# Patient Record
Sex: FEMALE | Race: BLACK OR AFRICAN AMERICAN
[De-identification: names, ages, dates, MRNs, and addresses within clinical notes are randomized per-mention and may not be internally consistent; named-entity substitution may affect disease eponyms.]

---

## 2017-05-15 ENCOUNTER — HOSPITAL ENCOUNTER (OUTPATIENT)
Dept: HOSPITAL 62 - END | Age: 67
Discharge: HOME | End: 2017-05-15
Attending: INTERNAL MEDICINE
Payer: MEDICARE

## 2017-05-15 VITALS — SYSTOLIC BLOOD PRESSURE: 127 MMHG | DIASTOLIC BLOOD PRESSURE: 67 MMHG

## 2017-05-15 DIAGNOSIS — Z79.82: ICD-10-CM

## 2017-05-15 DIAGNOSIS — D12.5: Primary | ICD-10-CM

## 2017-05-15 DIAGNOSIS — E78.2: ICD-10-CM

## 2017-05-15 DIAGNOSIS — I10: ICD-10-CM

## 2017-05-15 DIAGNOSIS — Z79.899: ICD-10-CM

## 2017-05-15 DIAGNOSIS — Z79.84: ICD-10-CM

## 2017-05-15 DIAGNOSIS — I25.9: ICD-10-CM

## 2017-05-15 DIAGNOSIS — G40.909: ICD-10-CM

## 2017-05-15 DIAGNOSIS — E11.9: ICD-10-CM

## 2017-05-15 DIAGNOSIS — I25.10: ICD-10-CM

## 2017-05-15 DIAGNOSIS — K64.8: ICD-10-CM

## 2017-05-15 DIAGNOSIS — D64.9: ICD-10-CM

## 2017-05-15 PROCEDURE — 88305 TISSUE EXAM BY PATHOLOGIST: CPT

## 2017-05-15 PROCEDURE — 45380 COLONOSCOPY AND BIOPSY: CPT

## 2017-05-15 PROCEDURE — 82962 GLUCOSE BLOOD TEST: CPT

## 2017-05-15 PROCEDURE — 0DBN8ZX EXCISION OF SIGMOID COLON, VIA NATURAL OR ARTIFICIAL OPENING ENDOSCOPIC, DIAGNOSTIC: ICD-10-PCS | Performed by: INTERNAL MEDICINE

## 2017-05-15 NOTE — OPERATIVE REPORT
Operative Report


DATE OF SURGERY: 05/15/17


Operative Report: 


The risks, benefits and alternatives of the procedure including risks of 

bleeding, perforation requiring surgery are explained to the patient detail and 

informed consent is obtained.  Patient is brought back to the endoscopy suite 

and placed in a left, lateral decubital position.  Timeout is called.  Propofol 

medications administered.  A rectal examination was done which did not reveal 

any masses, tears or fissures.  An Olympus was scope is inserted into the 

patient's rectum.  The scope was then gradually advanced all the way to the 

cecum.  The cecum was identified by the usual anatomical landmarks including 

the ileocecal valve as well as the appendiceal office.  Photodocumentation was 

obtained.  The scope is then sequentially pulled back via the rest segments of 

the colon including the ascending colon, hepatic flexure, transverse colon, 

splenic flexure, descending colon and finally into the rectosigmoid portions of 

the colon.  Retroflexion maneuvers performed.  Prep is good.


PREOPERATIVE DIAGNOSIS: Personal history of polyps.


POSTOPERATIVE DIAGNOSIS: Small sessile polyp in the sigmoid colon status post 

biopsy for removal.  Internal hemorrhoids


OPERATION: Colonoscopy with biopsy


SURGEON: PARAMJIT LLOYD


ANESTHESIA: LMAC


TISSUE REMOVED OR ALTERED: Sessile polyp removed as described above


COMPLICATIONS: 


None.


ESTIMATED BLOOD LOSS: none


INTRAOPERATIVE FINDINGS: No AVMs, diverticulosis noted.  Internal hemorrhoids 

are visualized.


PROCEDURE: 


Patient tolerated procedure well.


No immediate postprocedure complications are noted.


Patient discharged in good condition.


Discharge date 05/15/2017.


Discharge diet: Regular.


Discharge activity: Regular.


2-3 week follow-up to discuss findings.


Patient is instructed to call the office or proceed to the emergency room 

should there be any further problems or questions.


We'll await on biopsies.

## 2017-10-12 ENCOUNTER — HOSPITAL ENCOUNTER (EMERGENCY)
Dept: HOSPITAL 62 - ER | Age: 67
Discharge: HOME | End: 2017-10-12
Payer: MEDICARE

## 2017-10-12 VITALS — SYSTOLIC BLOOD PRESSURE: 142 MMHG | DIASTOLIC BLOOD PRESSURE: 81 MMHG

## 2017-10-12 DIAGNOSIS — I25.2: ICD-10-CM

## 2017-10-12 DIAGNOSIS — I10: ICD-10-CM

## 2017-10-12 DIAGNOSIS — E11.9: ICD-10-CM

## 2017-10-12 DIAGNOSIS — M25.462: Primary | ICD-10-CM

## 2017-10-12 DIAGNOSIS — I25.10: ICD-10-CM

## 2017-10-12 DIAGNOSIS — Z95.5: ICD-10-CM

## 2017-10-12 PROCEDURE — 99283 EMERGENCY DEPT VISIT LOW MDM: CPT

## 2017-10-12 NOTE — ER DOCUMENT REPORT
ED Extremity Problem, Lower





- General


Chief Complaint: Leg Swelling


Stated Complaint: LEFT KNEE SWELLING


Time Seen by Provider: 10/12/17 17:11


Mode of Arrival: Ambulatory


Information source: Patient


Notes: 





67-year-old female presents to ED for complaint of abnormal swelling to the 

left knee.  She has no history of trauma or pain.  She states it swelled up 

like this about a year ago with back canal without call back if she is 

concerned.


TRAVEL OUTSIDE OF THE [place] IN LAST 30 DAYS: No





- HPI


Patient complains to provider of: Swelling.  No: Injury, Pain


Location: Knee


Occurred: Last week


Where: Home


Onset/Duration: Gradual


Quality of pain: No pain


Severity: None


Pain Level: Denies


Context: Other


Recent injury: No


Exacerbated by: Nothing


Relieved by: Nothing





- Related Data


Allergies/Adverse Reactions: 


 





No Known Allergies Allergy (Verified 10/12/17 16:58)


 











Past Medical History





- General


Information source: Patient





- Social History


Smoking Status: Never Smoker


Cigarette use (# per day): No


Chew tobacco use (# tins/day): No


Smoking Education Provided: No


Frequency of alcohol use: None


Drug Abuse: None


Occupation: none


Family History: Reviewed & Not Pertinent


Patient has suicidal ideation: No





- Past Medical History


Cardiac Medical History: Reports: Hx Coronary Artery Disease, Hx Heart Attack - 

MILD 2013 STENT 2013, Hx Hypercholesterolemia, Hx Hypertension


Pulmonary Medical History: Reports: None


EENT Medical History: Reports: None


Neurological Medical History: Reports: Hx Cerebrovascular Accident - 2000 

WEAKER ON LEFT, Hx Seizures - 2000 LAST SEIZURE, Other - brownlee brownlee


Endocrine Medical History: Reports: Hx Diabetes Mellitus Type 2


Renal/ Medical History: Reports: None


Malignancy Medical History: Reports: None


GI Medical History: Reports: Hx Colonoscopy


Musculoskeltal Medical History: Reports Hx Musculoskeletal Trauma


Skin Medical History: Reports None


Psychiatric Medical History: Reports: None


Traumatic Medical History: Reports: None


Infectious Medical History: Reports: None


Past Surgical History: Reports: Hx Cardiac Catheterization, Hx Coronary Stent, 

Hx Neurologic Surgery - Brain surgery 2 for more more, Hx Tonsillectomy





- Immunizations


Hx Diphtheria, Pertussis, Tetanus Vaccination: Yes





Review of Systems





- Review of Systems


Constitutional: No symptoms reported


EENT: No symptoms reported


Cardiovascular: No symptoms reported


Respiratory: No symptoms reported


Gastrointestinal: No symptoms reported


Genitourinary: No symptoms reported


Female Genitourinary: No symptoms reported


Musculoskeletal: Joint swelling - Swelling to the left knee


Skin: No symptoms reported


Hematologic/Lymphatic: No symptoms reported


Neurological/Psychological: No symptoms reported


-: Yes All other systems reviewed and negative





Physical Exam





- Vital signs


Vitals: 


 











Temp Pulse BP Pulse Ox


 


 99.4 F   99   149/76 H  96 


 


 10/12/17 17:01  10/12/17 17:01  10/12/17 17:01  10/12/17 17:01











Interpretation: Normal





- General


General appearance: Appears well, Alert





- HEENT


Head: Normocephalic, Atraumatic


Eyes: Normal


Pupils: PERRL





- Respiratory


Respiratory status: No respiratory distress


Chest status: Nontender


Breath sounds: Normal


Chest palpation: Normal





- Cardiovascular


Rhythm: Regular


Heart sounds: Normal auscultation


Murmur: No





- Abdominal


Inspection: Normal


Distension: No distension


Bowel sounds: Normal


Tenderness: Nontender


Organomegaly: No organomegaly





- Back


Back: Normal, Nontender





- Extremities


General upper extremity: Normal inspection, Nontender, Normal color, Normal ROM

, Normal temperature


General lower extremity: Nontender, Normal color, Normal ROM, Normal temperature

, Normal weight bearing.  No: Pedro's sign


Knee: Deformity - Swelling which looks like a bone malformation to the left 

knee.  No: Tender, Pain with ROM





- Neurological


Neuro grossly intact: Yes


Cognition: Normal


Orientation: AAOx4


[place] Coma Scale Eye Opening: Spontaneous


[place] Coma Scale Verbal: Oriented


[place] Coma Scale Motor: Obeys Commands


[place] Coma Scale Total: 15


Speech: Normal


Motor strength normal: LUE, RUE, LLE, RLE


Sensory: Normal





- Psychological


Associated symptoms: Normal affect, Normal mood





- Skin


Skin Temperature: Warm


Skin Moisture: Dry


Skin Color: Normal





Course





- Re-evaluation


Re-evalutation: 





10/12/17 18:43


Discussed x-ray with patient.  Patient has had no pain in this area has no pain 

in this area at this time.  Will recommend patient follow-up with orthopedics 

with continued concern about this area of her knee.  X-ray was negative.





- Vital Signs


Vital signs: 


 











Temp Pulse Resp BP Pulse Ox


 


 99.4 F   99      142/81 H  96 


 


 10/12/17 17:01  10/12/17 19:09     10/12/17 19:09  10/12/17 19:09














- Diagnostic Test


Radiology reviewed: Image reviewed, Reports reviewed





Discharge





- Discharge


Clinical Impression: 


 Swelling of left knee joint





Condition: Stable


Disposition: HOME, SELF-CARE


Additional Instructions: 


She was seen today for "swelling to your knee ".  There is a area of you needs 

it is larger than on the other knee.  It is firm.  According to the x-ray there 

is no breaks no abnormalities in the knee.





You have denied any pain to this area at this time.





Follow-up with orthopedics to have them reevaluate your knee in this area it is 

concerning to you.





Use Tylenol or ibuprofen for any discomfort that may develop.





Acetaminophen





     Acetaminophen may be taken for pain relief or fever control. It's much 

safer than aspirin, offering a wider range of "safe" dosages.  It is safe 

during pregnancy.  Some brand names are Tylenol, Panadol, Datril, Anacin 3, 

Tempra, and Liquiprin. Acetaminophen can be repeated every four hours.  The 

following are maximum recommended dosages:





WEIGHT         Dose             Drops                  Elixir        Chewable(

80mg)


(LBS.)                            drprs=droppers    tsp=teaspoon


6                 40 mg            .4 ml (1/2)


6-11            80 mg            .8 ml (full)            1/2   tsp           1 

      tab


12-16         120 mg           1 1/2 drprs            3/4   tsp           1 1/2

  tabs


17-23         160 mg             2  drprs              1      tsp           2  

     tabs


24-30         240 mg             3  drprs              1 1/2 tsp           3   

    tabs


30-35         320 mg                                     2       tsp           

4       tabs


36-41         360 mg                                     2 1/4 tsp           4 1

/2  tabs


42-47         400 mg                                     2 1/2 tsp           5 

      tabs


48-53         480 mg                                     3       tsp          6

       tabs


54-59         520 mg                                     3  1/4 tsp          6 1

/2 tabs


60-64         560 mg                                     3  1/2 tsp          7 

     tabs 


65-70         600 mg                                     3  3/4 tsp          7 1

/2 tabs


71-76         640 mg                                     4       tsp           

8      tabs


77-82         720 mg                                     4 1/2  tsp           9

      tabs


83-88         800 mg                                     5       tsp           

10     tabs





>89 pounds or adults          650 mg to 900 mg 





Acetaminophen can be repeated every four hours. Maximum daily dose not to 

exceed 4000 mg.





   These maximum recommended dosages are slightly higher than the dosages 

written on the product container, but these dosages are very safe and well 

below the toxic dosage for acetaminophen.





FOLLOW-UP CARE:


If you have been referred to a physician for follow-up care, call the physician

s office for an appointment as you were instructed or within the next two days.

  If you experience worsening or a significant change in your symptoms, notify 

the physician immediately or return to the Emergency Department at any time for 

re-evaluation.





Forms:  Elevated Blood Pressure


Referrals: 


FARIDA MELTON MD [Primary Care Provider] - Follow up as needed


HA ALAS MD [ACTIVE STAFF] - Follow up as needed

## 2017-10-12 NOTE — RADIOLOGY REPORT (SQ)
EXAM DESCRIPTION:  KNEE LEFT 4 VIEW



COMPLETED DATE/TIME:  10/12/2017 5:48 pm



REASON FOR STUDY:  swelling



COMPARISON:  None.



NUMBER OF VIEWS:  Four views.



TECHNIQUE:  AP, lateral, and both oblique radiographic images acquired of the left knee.



LIMITATIONS:  None.



FINDINGS:  MINERALIZATION: Osteopenia

BONES: No acute fracture or dislocation.  No worrisome bone lesions.  Benign bony spurring at the sandie
driceps and patellar tendon attachments to the patella

JOINT: No effusion.

SOFT TISSUES: No soft tissue swelling.  No radio-opaque foreign body.

OTHER: No other significant finding.



IMPRESSION:  No acute changes



TECHNICAL DOCUMENTATION:  JOB ID:  [id number]

 2011 [employer]- All Rights Reserved

## 2019-01-04 ENCOUNTER — HOSPITAL ENCOUNTER (OUTPATIENT)
Dept: HOSPITAL 62 - SP | Age: 69
End: 2019-01-04
Attending: PODIATRIST
Payer: MEDICARE

## 2019-01-04 DIAGNOSIS — E11.9: ICD-10-CM

## 2019-01-04 DIAGNOSIS — I70.25: Primary | ICD-10-CM

## 2019-01-04 PROCEDURE — 93922 UPR/L XTREMITY ART 2 LEVELS: CPT

## 2019-01-04 PROCEDURE — 93925 LOWER EXTREMITY STUDY: CPT

## 2019-01-04 NOTE — XCELERA REPORT
[address] [address]

                               Tel: [phone]

                               Fax: [phone]



                      Lower Extremity Arterial Evaluation

_______________________________________________________________________________



Name: RASHMI TAYLOR

MRN: [id number]                                       Age: 68 yrs

Gender: Female                                        : 1950

Patient Status: Outpatient                            Patient Location: [facility]

Account #: [id number]

Study Date: 2019 12:17 PM

                          Accession #: [id number]

_______________________________________________________________________________

Procedure: A color flow and duplex scan of the lower extremity arteries was

performed bilaterally with velocity and waveform anaylsis. Ankle brachial

indicies performed.

Reason For Study: PAD, DM







Ordering Physician: TELLO PARTIDA

Performed By: Sandra Pacheco

_______________________________________________________________________________

_______________________________________________________________________________





Measurements and Calculations



                                     Right  Left

                     Prox PFA PSV    117.1 114.4 cm/sec

                     Prox SFA PSV    116.4  81.9 cm/sec

                     Mid SFA PSV     70.3   87.7 cm/sec

                     Dist SFA PSV    90.9  109.4 cm/sec

                     Prox Pop A PSV  49.6        cm/sec

                     Mid ROLAND PSV     46.0        cm/sec

                     Dist ROLAND PSV           87.3 cm/sec

                     Mid PTA PSV     49.5   39.7 cm/sec



_______________________________________________________________________________

Right Side Arterial Evaluation

Normal velocity and triphasic waveforms noted in the Common Femoral artery.

Biphasic with normal velocity to the infrageniculate. vessels .

Ankle Brachial index 1.06, in the Posterior Tibial. 0.83 in the Dorsalis

Pedis..

PPG's are mildly attenuated, demonstrate dicrotic notch.



Left Side Arterial Evaluation

Normal velocity and biphasic waveforms noted in the Common Femoral artery to

the infrageniculate. vessels .

Ankle Brachial index 1.08, in the Posterior Tibial. 1.16 in the Dorsalis

Pedis..

PPG's are mildly attenuated, demonstrate dicrotic notch.



_______________________________________________________________________________

Interpretation Summary

Fairly small abnormalities in waveform, ANIA's and PPG's suggest very mild

arterial disease, bilaterally.

_______________________________________________________________________________

Electronically signed by:      Lennox Williams      on 2019 04:28 PM



CC: TELLO PARTIDA

>

Williams, Lennox

## 2019-04-15 ENCOUNTER — HOSPITAL ENCOUNTER (OUTPATIENT)
Dept: HOSPITAL 62 - WI | Age: 69
End: 2019-04-15
Attending: INTERNAL MEDICINE
Payer: MEDICARE

## 2019-04-15 DIAGNOSIS — Z12.31: Primary | ICD-10-CM

## 2019-04-15 PROCEDURE — 77063 BREAST TOMOSYNTHESIS BI: CPT

## 2019-04-15 PROCEDURE — 77067 SCR MAMMO BI INCL CAD: CPT

## 2020-10-28 ENCOUNTER — HOSPITAL ENCOUNTER (OUTPATIENT)
Dept: HOSPITAL 62 - WI | Age: 70
End: 2020-10-28
Attending: PHYSICIAN ASSISTANT
Payer: MEDICARE

## 2020-10-28 DIAGNOSIS — Z12.31: Primary | ICD-10-CM

## 2020-10-28 DIAGNOSIS — Z78.0: ICD-10-CM

## 2020-10-28 DIAGNOSIS — M85.852: ICD-10-CM

## 2020-10-28 PROCEDURE — 77063 BREAST TOMOSYNTHESIS BI: CPT

## 2020-10-28 PROCEDURE — 77080 DXA BONE DENSITY AXIAL: CPT

## 2020-10-28 PROCEDURE — 77067 SCR MAMMO BI INCL CAD: CPT

## 2020-10-28 NOTE — WOMENS IMAGING REPORT
EXAM DESCRIPTION:  BONE DENSITY HIP/SPINE



IMAGES COMPLETED DATE/TIME:  10/28/2020 8:30 am



REASON FOR STUDY:  Z78.0 ASYMPTOMATIC MENOPAUSAL STATE Z12.31  ENCNTR SCREEN MAMMOGRAM FOR MALIGNANT 
NEOPLASM OF LULI Z78.0  ASYMPTOMATIC MENOPAUSAL STATE



COMPARISON:   None.



TECHNIQUE:  Dual-Energy X-ray Absorptiometry (DEXA) of the AP Spine and Hip.



LIMITATIONS:  None.



FINDINGS:  LUMBAR SPINE:

The bone mineral density (BMD) measured from L1-L4 in the AP projection correlates with a T-score of 
-0.9, which is normal as defined by the World Health Organization.

HIP:

The bone mineral density (BMD) measured in the left hip correlates with a T-score of -1.4, which is o
steopenia as defined by the World Health Organization.

10 year fracture risk

Major osteoporotic fracture: 3.8%

Hip fracture: 0.4%



IMPRESSION:  1. LUMBAR SPINE: NORMAL.

2.  HIP: OSTEOPENIA.



COMMENT:  The World Health Organization defines low BMD as follows:

T-score:

Normal:  Greater than -1.0

Osteopenia: Between -1.0 and -2.5

Osteoporosis:  Less than -2.5 without fractures

Established osteoporosis:  Less than -2.5 with fractures

In general, you may wish to consider:

Diagnosis          Treatment                     Follow-up DEXA

Normal BMD      Prevention                    2-3 years

Osteopenia       Prevention/Therapy        1-2 years

Osteoporosis     Therapy                        Yearly



TECHNICAL DOCUMENTATION:  JOB ID:  [id number]

 [employer]- All Rights Reserved



Reading location - IP/workstation name: [email or web address]

## 2020-10-28 NOTE — WOMENS IMAGING REPORT
EXAM DESCRIPTION:  3D SCREENING MAMMO BILAT



IMAGES COMPLETED DATE/TIME:  10/28/2020 8:30 am



REASON FOR STUDY:  Z12.31 ENCNTR SCREEN MAMMOGRAM FOR MALIGNANT NEOPLASM OF BREAST Z12.31  ENCNTR SCR
EEN MAMMOGRAM FOR MALIGNANT NEOPLASM OF LULI Z78.0  ASYMPTOMATIC MENOPAUSAL STATE



COMPARISON:  4/15/2019.



EXAM PARAMETERS:  Standard craniocaudal and mediolateral oblique views of each breast recorded using 
digital acquisition and breast tomosynthesis.

Read with the assistance of CAD.

.[facility] - [employer]  Version 9.2



LIMITATIONS:  None.



FINDINGS:  Findings present which are benign by mammographic criteria. No suspicious masses, calcific
ations or architectural distortion.

Pertinent benign findings: Stable circumscribed mass in the right breast.

Benign mammographic findings may include one or more of the following: Smooth masses, popcorn/rim/coa
rse calcifications, asymmetries, post-procedure changes, and lesions with long-standing stability.



IMPRESSION:  BENIGN MAMMOGRAPHIC FINDINGS.  BIRADS 2



BREAST DENSITY:  a. The breasts are almost entirely fatty.



BIRAD:  ASSESSMENT:  2 BENIGN FINDING(S)



RECOMMENDATION:  ROUTINE SCREENING



COMMENT:  The patient has been notified of the results by letter per SA requirements. Additional no
tification policies are in place for contacting patient with suspicious or incomplete findings.

Quality ID #225: The American College of Radiology recommends an annual screening mammogram for women
 aged 40 years or over. This facility utilizes a reminder system to ensure that all patients receive 
reminder letters, and/or direct phone calls for appointments. This includes reminders for routine scr
eening mammograms, diagnostic mammograms, or other Breast Imaging Interventions when appropriate.  Th
is patient will be placed in the appropriate reminder system.



TECHNICAL DOCUMENTATION:  FINDING NUMBER: (1)

ASSESSMENT:  (1)

JOB ID:  [id number]

 2011 [employer]- All Rights Reserved



Reading location - IP/workstation name: [email or web address]